# Patient Record
Sex: FEMALE | Race: WHITE
[De-identification: names, ages, dates, MRNs, and addresses within clinical notes are randomized per-mention and may not be internally consistent; named-entity substitution may affect disease eponyms.]

---

## 2020-07-17 ENCOUNTER — APPOINTMENT (OUTPATIENT)
Dept: VASCULAR SURGERY | Facility: CLINIC | Age: 60
End: 2020-07-17
Payer: COMMERCIAL

## 2020-07-17 PROCEDURE — 99204 OFFICE O/P NEW MOD 45 MIN: CPT

## 2020-09-18 ENCOUNTER — APPOINTMENT (OUTPATIENT)
Dept: VASCULAR SURGERY | Facility: CLINIC | Age: 60
End: 2020-09-18
Payer: COMMERCIAL

## 2020-09-18 PROCEDURE — 99214 OFFICE O/P EST MOD 30 MIN: CPT

## 2020-12-16 ENCOUNTER — APPOINTMENT (OUTPATIENT)
Dept: VASCULAR SURGERY | Facility: CLINIC | Age: 60
End: 2020-12-16
Payer: COMMERCIAL

## 2020-12-16 PROCEDURE — 99072 ADDL SUPL MATRL&STAF TM PHE: CPT

## 2020-12-16 PROCEDURE — 99214 OFFICE O/P EST MOD 30 MIN: CPT

## 2021-04-02 ENCOUNTER — APPOINTMENT (OUTPATIENT)
Dept: VASCULAR SURGERY | Facility: CLINIC | Age: 61
End: 2021-04-02
Payer: COMMERCIAL

## 2021-04-02 PROCEDURE — 99214 OFFICE O/P EST MOD 30 MIN: CPT

## 2021-04-02 PROCEDURE — 99072 ADDL SUPL MATRL&STAF TM PHE: CPT

## 2021-11-10 ENCOUNTER — APPOINTMENT (OUTPATIENT)
Dept: VASCULAR SURGERY | Facility: CLINIC | Age: 61
End: 2021-11-10
Payer: COMMERCIAL

## 2021-11-10 PROCEDURE — 99213 OFFICE O/P EST LOW 20 MIN: CPT

## 2022-02-09 ENCOUNTER — APPOINTMENT (OUTPATIENT)
Dept: VASCULAR SURGERY | Facility: CLINIC | Age: 62
End: 2022-02-09
Payer: COMMERCIAL

## 2022-02-09 PROCEDURE — 99214 OFFICE O/P EST MOD 30 MIN: CPT

## 2022-02-10 PROBLEM — Z00.00 ENCOUNTER FOR PREVENTIVE HEALTH EXAMINATION: Status: ACTIVE | Noted: 2022-02-10

## 2022-06-10 ENCOUNTER — APPOINTMENT (OUTPATIENT)
Dept: VASCULAR SURGERY | Facility: CLINIC | Age: 62
End: 2022-06-10
Payer: COMMERCIAL

## 2022-06-10 VITALS — DIASTOLIC BLOOD PRESSURE: 78 MMHG | HEART RATE: 111 BPM | SYSTOLIC BLOOD PRESSURE: 155 MMHG

## 2022-06-10 VITALS — SYSTOLIC BLOOD PRESSURE: 171 MMHG | HEART RATE: 96 BPM | DIASTOLIC BLOOD PRESSURE: 84 MMHG

## 2022-06-10 PROCEDURE — 36475 ENDOVENOUS RF 1ST VEIN: CPT

## 2022-06-10 NOTE — ADDENDUM
[FreeTextEntry1] : The patient was given verbal and written instructions. The patient verbalized understanding of the instructions. Will follow up next week for post procedure u/s.

## 2022-06-10 NOTE — PROCEDURE
[FreeTextEntry1] : Left leg RFA of GSV [FreeTextEntry2] : Symptomatic varicose veins, left leg, symptomatic venous reflux, left leg [FreeTextEntry3] : The patient was seen in the waiting room where the consent was obtained. She was then taken to the procedure room where a timeout was called to verify the patient's identity and the laterality of the procedure. The patient's left leg was then interrogated under ultrasound and an appropriate place for cannulation was identified. The leg was then prepped and draped in the standard fashion. Under ultrasound guidance the patient was given an injection of 1% plain lidocaine about the knee. Access was then obtained with a 7 FR sheath in the standard fashion. Catheter was placed to the SFJ and withdrawn 2.5 cm from the junction. Patient was then given tumescence around the saphenous vein under ultrasound guidance. The ablation was then performed. Steri strips were applied to the catheter insertion site. Leg was wrapped in kerlix, and an ace wrap. Patient was discharged in stable condition.

## 2022-06-16 ENCOUNTER — APPOINTMENT (OUTPATIENT)
Dept: VASCULAR SURGERY | Facility: CLINIC | Age: 62
End: 2022-06-16

## 2022-06-16 ENCOUNTER — APPOINTMENT (OUTPATIENT)
Dept: VASCULAR SURGERY | Facility: CLINIC | Age: 62
End: 2022-06-16
Payer: COMMERCIAL

## 2022-06-16 VITALS — HEART RATE: 90 BPM | DIASTOLIC BLOOD PRESSURE: 79 MMHG | SYSTOLIC BLOOD PRESSURE: 154 MMHG

## 2022-06-16 DIAGNOSIS — I10 ESSENTIAL (PRIMARY) HYPERTENSION: ICD-10-CM

## 2022-06-16 DIAGNOSIS — Z87.891 PERSONAL HISTORY OF NICOTINE DEPENDENCE: ICD-10-CM

## 2022-06-16 DIAGNOSIS — Z78.9 OTHER SPECIFIED HEALTH STATUS: ICD-10-CM

## 2022-06-16 DIAGNOSIS — E78.5 HYPERLIPIDEMIA, UNSPECIFIED: ICD-10-CM

## 2022-06-16 DIAGNOSIS — Z80.8 FAMILY HISTORY OF MALIGNANT NEOPLASM OF OTHER ORGANS OR SYSTEMS: ICD-10-CM

## 2022-06-16 PROCEDURE — 99213 OFFICE O/P EST LOW 20 MIN: CPT

## 2022-06-16 PROCEDURE — 93971 EXTREMITY STUDY: CPT

## 2022-06-20 PROBLEM — Z78.9 CONSUMES ALCOHOL OCCASIONALLY: Status: ACTIVE | Noted: 2022-06-16

## 2022-06-20 PROBLEM — Z80.8 FAMILY HISTORY OF MALIGNANT NEOPLASM OF SKIN: Status: ACTIVE | Noted: 2022-06-16

## 2022-06-20 PROBLEM — Z87.891 FORMER SMOKER: Status: ACTIVE | Noted: 2022-06-16

## 2022-06-20 PROBLEM — I10 HYPERTENSION: Status: ACTIVE | Noted: 2022-06-16

## 2022-06-20 PROBLEM — E78.5 HYPERLIPIDEMIA: Status: ACTIVE | Noted: 2022-06-16

## 2022-06-20 RX ORDER — AMLODIPINE BESYLATE 5 MG/1
TABLET ORAL
Refills: 0 | Status: ACTIVE | COMMUNITY

## 2022-06-20 RX ORDER — ATORVASTATIN CALCIUM 80 MG/1
TABLET, FILM COATED ORAL
Refills: 0 | Status: ACTIVE | COMMUNITY

## 2022-06-20 NOTE — HISTORY OF PRESENT ILLNESS
[FreeTextEntry1] : 61-year-old female who is followed by me for symptomatic lower extremity varicose veins and symptomatic venous reflux.  Her left-sided symptoms were persistent despite the daily use of compression garments and frequent elevation of her legs.  Venous duplex revealed superficial venous reflux.\par She is now 1 week status post left leg RFA of GSV.\par She is doing well without complaints.

## 2022-06-20 NOTE — ASSESSMENT
[FreeTextEntry1] : 62-year-old female with symptomatic left leg varicose veins and symptomatic venous reflux, who is 1 week status post left leg RFA of GSV.  Her postprocedure ultrasound is within normal limits, her symptoms are reduced.  I have recommended to continue use of compression garments on a daily basis.  She will follow-up with me in several weeks, at which point we will determine if phlebectomy is necessary based on her symptoms.

## 2022-06-20 NOTE — PHYSICAL EXAM
[Normal Breath Sounds] : Normal breath sounds [2+] : left 2+ [Alert] : alert [Oriented to Person] : oriented to person [Oriented to Place] : oriented to place [Calm] : calm [de-identified] : NAD [de-identified] : NCAT [de-identified] : supple [de-identified] : soft, nt, nd [de-identified] : Right posterior knee with large clusters of varicose veins, left medial calf with varicose veins soft and compressible

## 2022-07-28 ENCOUNTER — APPOINTMENT (OUTPATIENT)
Dept: VASCULAR SURGERY | Facility: CLINIC | Age: 62
End: 2022-07-28

## 2022-07-28 VITALS — WEIGHT: 120.6 LBS | BODY MASS INDEX: 22.19 KG/M2 | HEIGHT: 62 IN

## 2022-07-28 PROCEDURE — 99213 OFFICE O/P EST LOW 20 MIN: CPT

## 2022-07-28 NOTE — HISTORY OF PRESENT ILLNESS
[FreeTextEntry1] : 61-year-old female who is followed by me for symptomatic lower extremity varicose veins and symptomatic venous reflux.  Her left-sided symptoms were persistent despite the daily use of compression garments and frequent elevation of her legs.  Venous duplex revealed superficial venous reflux.\par She is now 1 week status post left leg RFA of GSV.\par She is doing well without complaints. [de-identified] : 7/28/22 - She is approximately 6-week status post left leg RFA of GSV.  Her varicose veins are resolved, and she no longer has heaviness and aching of her leg.  She is doing quite well.

## 2022-07-28 NOTE — ASSESSMENT
[FreeTextEntry1] : 62-year-old female with symptomatic left leg varicose veins and symptomatic venous reflux, who is 8 weeks status post left leg RFA of GSV.  Her postprocedure ultrasound is within normal limits, her symptoms are reduced.  \par Her varicose veins are no longer apparent, and her symptoms are fully resolved.\par She has done quite well.  I have suggested continued use of compression as tolerated.\par She will follow-up with me on an as-needed basis.

## 2022-07-28 NOTE — PHYSICAL EXAM
[Normal Breath Sounds] : Normal breath sounds [2+] : left 2+ [Alert] : alert [Oriented to Person] : oriented to person [Oriented to Place] : oriented to place [Calm] : calm [de-identified] : NAD [de-identified] : NCAT [de-identified] : supple [de-identified] : soft, nt, nd [de-identified] : Right posterior knee with large clusters of varicose veins, left medial calf with varicose veins soft and compressible

## 2023-03-10 ENCOUNTER — APPOINTMENT (OUTPATIENT)
Dept: VASCULAR SURGERY | Facility: CLINIC | Age: 63
End: 2023-03-10
Payer: COMMERCIAL

## 2023-03-10 VITALS — BODY MASS INDEX: 22.08 KG/M2 | HEIGHT: 62 IN | WEIGHT: 120 LBS

## 2023-03-10 DIAGNOSIS — I82.401 ACUTE EMBOLISM AND THROMBOSIS OF UNSPECIFIED DEEP VEINS OF RIGHT LOWER EXTREMITY: ICD-10-CM

## 2023-03-10 PROCEDURE — 99215 OFFICE O/P EST HI 40 MIN: CPT

## 2023-03-10 RX ORDER — APIXABAN 5 MG/1
5 TABLET, FILM COATED ORAL
Qty: 180 | Refills: 2 | Status: ACTIVE | COMMUNITY
Start: 2023-03-10 | End: 1900-01-01

## 2023-03-10 NOTE — HISTORY OF PRESENT ILLNESS
[FreeTextEntry1] : 61-year-old female who is followed by me for symptomatic lower extremity varicose veins and symptomatic venous reflux.  Her left-sided symptoms were persistent despite the daily use of compression garments and frequent elevation of her legs.  Venous duplex revealed superficial venous reflux.\par She is now 1 week status post left leg RFA of GSV.\par She is doing well without complaints. [de-identified] : 7/28/22 - She is approximately 6-week status post left leg RFA of GSV.  Her varicose veins are resolved, and she no longer has heaviness and aching of her leg.  She is doing quite well.\par \par 3/10/23 - She returns for an urgent visit.  She reports that approximately 2 weeks ago, she had an episode of bleeding from a superficial vein of her right leg.  She reports that this happened after she was shaving her legs, unsure if it was from a superficial vein.  Regardless, she applied pressure dressing visit at the [place] ER.  In the ER the bleeding had stopped.  Over the last week, she developed pain, tenderness and redness overlying the grapelike cluster of varicose veins behind her right knee.  She denies new onset swelling of her leg including the calf or thigh.  She reports that due to the tenderness overlying the veins on the back of her right knee, she has had some trouble fully bending her knee.

## 2023-03-10 NOTE — PHYSICAL EXAM
[Normal Breath Sounds] : Normal breath sounds [2+] : left 2+ [Alert] : alert [Oriented to Person] : oriented to person [Oriented to Place] : oriented to place [Calm] : calm [de-identified] : NAD [de-identified] : NCAT [de-identified] : supple [de-identified] : soft, nt, nd [de-identified] : Right posterior knee with large clusters of varicose veins, varicose veins involving the mid to upper posterior right calf -noncompressible, overlying erythema and tenderness to palpation

## 2023-03-10 NOTE — ASSESSMENT
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  Due to the extensive distribution of varicosities involved, I discussed with her anticoagulation for 45 days to 2-month span, and I prescribed this to her.  In addition, I have advised for symptomatic and supportive care with the use of warm compresses multiple times a day to the affected area, as well as utilizing compression garments improves and she can tolerate this.  She will take anti-inflammatories for the next 10 to 14 days for pain control and to reduce inflammation overlying the thrombosed varicosities.  In addition, we will obtain a venous duplex to rule out deep venous involvement, although she will be anticoagulated at this time anyway.\par She will return for follow-up so that we can ensure clinical improvement.  I discussed with her that the distribution of chronic blood varicosities on the back of her right knee may remain thrombosed without recanalization.  She understands.\par She does have superficial GSV reflux of the right leg, and after clinical resolution of a course of SVT, we may consider ablation to minimize chances of recurrence and alleviate her symptoms.

## 2023-03-10 NOTE — DATA REVIEWED
[FreeTextEntry1] : SP DUPLEX VENOUS LOWER EXTREMITY RIGHT \par  \par Clinical statement: [id number]: Varicose veins of r low extrem with other \par complications,    C/C: \par   \par RIGHT: The common femoral, femoral, and popliteal veins have normal spontaneity, \par phasicity, augmentation, and compressibility. The saphenous veins also have \par normal compressibility. There is no evidence of a deep or superficial venous \par thrombosis. \par   \par VEIN MAPPING: \par   \par RIGHT: \par                                                      Size \par Greater saphenous vein               \par   High thigh                                   5.4 mm \par   Mid thigh                                     5.4 mm \par   Lower thigh                                 3 mm \par   Knee                                           3.9 mm \par   Upper calf                                   2.3 mm \par   Mid Calf                                      2.0 mm \par   Lower calf                                  1.5 mm \par    \par Small saphenous vein                  \par   High calf                                    2 mm \par   Mid calf \par   Lower calf \par   \par IMPRESSION: No evidence for DVT right lower extremity. \par   \par There is reflux in the common femoral vein and femoral vein involving its \par proximal mid and distal portions. The common femoral vein this measures up to \par 3.4 seconds and in the femoral vein maximal interval is 3.1 seconds. \par   \par There is reflux in the greater saphenous vein from the upper thigh to the level \par of the knee and into the mid calf. The longest measured interval is 5.0 seconds. \par   \par Electronically Signed in [employer] By Dr. Marcellus Cruz MD on 9/18/2020 11:04 \par AM

## 2023-03-16 ENCOUNTER — APPOINTMENT (OUTPATIENT)
Dept: VASCULAR SURGERY | Facility: CLINIC | Age: 63
End: 2023-03-16
Payer: COMMERCIAL

## 2023-03-16 PROCEDURE — 99214 OFFICE O/P EST MOD 30 MIN: CPT

## 2023-03-16 PROCEDURE — 93971 EXTREMITY STUDY: CPT

## 2023-03-16 NOTE — HISTORY OF PRESENT ILLNESS
[FreeTextEntry1] : 61-year-old female who is followed by me for symptomatic lower extremity varicose veins and symptomatic venous reflux.  Her left-sided symptoms were persistent despite the daily use of compression garments and frequent elevation of her legs.  Venous duplex revealed superficial venous reflux.\par She is now 1 week status post left leg RFA of GSV.\par She is doing well without complaints. [de-identified] : 7/28/22 - She is approximately 6-week status post left leg RFA of GSV.  Her varicose veins are resolved, and she no longer has heaviness and aching of her leg.  She is doing quite well.\par \par 3/10/23 - She returns for an urgent visit.  She reports that approximately 2 weeks ago, she had an episode of bleeding from a superficial vein of her right leg.  She reports that this happened after she was shaving her legs, unsure if it was from a superficial vein.  Regardless, she applied pressure dressing visit at the [place] ER.  In the ER the bleeding had stopped.  Over the last week, she developed pain, tenderness and redness overlying the grapelike cluster of varicose veins behind her right knee.  She denies new onset swelling of her leg including the calf or thigh.  She reports that due to the tenderness overlying the veins on the back of her right knee, she has had some trouble fully bending her knee.\par \par 3/16/23 - She is doing very well  and reports her varicose veins are starting to decompress. She reports less pain than her last visit. She underwent a venous US and is here to discuss the results. She continues to wear compression stockings with improvement of her symptoms and continues to take Eliquis as prescribed.

## 2023-03-16 NOTE — PHYSICAL EXAM
[Normal Breath Sounds] : Normal breath sounds [2+] : left 2+ [Alert] : alert [Oriented to Person] : oriented to person [Oriented to Place] : oriented to place [Calm] : calm [de-identified] : NAD [de-identified] : NCAT [de-identified] : supple [de-identified] : soft, nt, nd [de-identified] : Right posterior knee with large clusters of varicose veins, varicose veins involving the mid to upper posterior right calf -noncompressible, overlying erythema and tenderness has significantly reduced

## 2023-03-16 NOTE — ASSESSMENT
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  Due to the extensive distribution of varicosities involved, I discussed with her anticoagulation for 45 days to 2-month span, and I prescribed this to her.  In addition, I have advised for symptomatic and supportive care with the use of warm compresses multiple times a day to the affected area, as well as utilizing compression garments improves and she can tolerate this.  She will take anti-inflammatories for the next 10 to 14 days for pain control and to reduce inflammation overlying the thrombosed varicosities.  In addition, we will obtain a venous duplex to rule out deep venous involvement, although she will be anticoagulated at this time anyway.\par She will return for follow-up so that we can ensure clinical improvement.  I discussed with her that the distribution of chronic blood varicosities on the back of her right knee may remain thrombosed without recanalization.  She understands.\par \par ON FOLLOW-UP, she has been compliant with anticoagulation which she has tolerated well, and her symptoms of extensive SVT are much reduced.  Significant portion of the varicosities have recanalized, and the erythema and tenderness overlying the affected segments is reduced.  We did perform a venous duplex, with results above, no DVT, no SVT involving the GSV or SSV, there is significant GSV reflux into the varicosities of concern.\par She will continue symptomatic care for another week, and she will continue anticoagulation for total of 6 to 8 weeks.  I discussed with her ablation of her refluxing GSV to avoid further symptoms or recurrent SVT.  She wishes to proceed.\par We will schedule right leg RFA of GSV.  She will continue compression therapy on a daily basis.

## 2023-03-16 NOTE — PROCEDURE
[FreeTextEntry1] : Right leg venous ultrasound\par No DVT\par There is extensive SVT involving the varicose veins behind the knee as well as varicosities above and below, no SVT involving the GSV or SSV

## 2023-03-16 NOTE — DATA REVIEWED
[FreeTextEntry1] : SP DUPLEX VENOUS LOWER EXTREMITY RIGHT \par  \par Clinical statement: [id number]: Varicose veins of r low extrem with other \par complications,    C/C: \par   \par RIGHT: The common femoral, femoral, and popliteal veins have normal spontaneity, \par phasicity, augmentation, and compressibility. The saphenous veins also have \par normal compressibility. There is no evidence of a deep or superficial venous \par thrombosis. \par \par svt\par right gsv reflux\par   \par VEIN MAPPING: \par   \par RIGHT: \par                                                      Size \par Greater saphenous vein               \par   High thigh                                   5.4 mm \par   Mid thigh                                     5.4 mm \par   Lower thigh                                 3 mm \par   Knee                                           3.9 mm \par   Upper calf                                   2.3 mm \par   Mid Calf                                      2.0 mm \par   Lower calf                                  1.5 mm \par    \par Small saphenous vein                  \par   High calf                                    2 mm \par   Mid calf \par   Lower calf \par   \par IMPRESSION: No evidence for DVT right lower extremity. \par   \par There is reflux in the common femoral vein and femoral vein involving its \par proximal mid and distal portions. The common femoral vein this measures up to \par 3.4 seconds and in the femoral vein maximal interval is 3.1 seconds. \par   \par There is reflux in the greater saphenous vein from the upper thigh to the level \par of the knee and into the mid calf. The longest measured interval is 5.0 seconds. \par   \par Electronically Signed in [employer] By Dr. Marcellus Cruz MD on 9/18/2020 11:04 \par AM

## 2023-06-08 ENCOUNTER — APPOINTMENT (OUTPATIENT)
Dept: VASCULAR SURGERY | Facility: CLINIC | Age: 63
End: 2023-06-08
Payer: COMMERCIAL

## 2023-06-08 VITALS — DIASTOLIC BLOOD PRESSURE: 75 MMHG | HEART RATE: 92 BPM | SYSTOLIC BLOOD PRESSURE: 121 MMHG

## 2023-06-08 VITALS — HEART RATE: 91 BPM | SYSTOLIC BLOOD PRESSURE: 153 MMHG | DIASTOLIC BLOOD PRESSURE: 88 MMHG

## 2023-06-08 PROCEDURE — 36475 ENDOVENOUS RF 1ST VEIN: CPT

## 2023-06-08 NOTE — PROCEDURE
[FreeTextEntry1] : Right leg RFA of GSV [FreeTextEntry2] : Right leg symptomatic varicose veins, symptomatic venous reflux [FreeTextEntry3] : The patient was seen in the waiting room where the consent was obtained. She was then taken to the procedure room where a timeout was called to verify the patient's identity and the laterality of the procedure. The patient's right leg was then interrogated under ultrasound and an appropriate place for cannulation was identified. The leg was then prepped and draped in the standard fashion. Under ultrasound guidance the patient was given an injection of 1% plain lidocaine about the knee. Access was then obtained with a 7 FR sheath in the standard fashion. Catheter was placed to the SFJ and withdrawn 2.5 cm from the junction. Patient was then given tumescence around the saphenous vein under ultrasound guidance. The ablation was then performed. Steri strips were applied to the catheter insertion site. Leg was wrapped in kerlix, and an ace wrap. Patient was discharged in stable condition.

## 2023-06-15 ENCOUNTER — APPOINTMENT (OUTPATIENT)
Dept: VASCULAR SURGERY | Facility: CLINIC | Age: 63
End: 2023-06-15
Payer: COMMERCIAL

## 2023-06-15 DIAGNOSIS — I80.01 PHLEBITIS AND THROMBOPHLEBITIS OF SUPERFICIAL VESSELS OF RIGHT LOWER EXTREMITY: ICD-10-CM

## 2023-06-15 DIAGNOSIS — I83.892 VARICOSE VEINS OF LEFT LOWER EXTREMITY WITH OTHER COMPLICATIONS: ICD-10-CM

## 2023-06-15 PROCEDURE — 99213 OFFICE O/P EST LOW 20 MIN: CPT

## 2023-06-15 PROCEDURE — 93971 EXTREMITY STUDY: CPT

## 2023-06-26 PROBLEM — I83.892 SYMPTOMATIC VARICOSE VEINS, LEFT: Status: ACTIVE | Noted: 2022-06-10

## 2023-06-29 PROBLEM — I80.01 THROMBOPHLEBITIS OF SUPERFICIAL VEINS OF RIGHT LOWER EXTREMITY: Status: ACTIVE | Noted: 2023-03-10

## 2023-06-29 NOTE — DATA REVIEWED
[FreeTextEntry1] : Right lower extremity venous duplex performed at our [facility] -\par \par No DVT\par \par Appropriately ablated GSV

## 2023-06-29 NOTE — HISTORY OF PRESENT ILLNESS
[FreeTextEntry1] : 61-year-old female who is followed by me for symptomatic lower extremity varicose veins and symptomatic venous reflux.  Her left-sided symptoms were persistent despite the daily use of compression garments and frequent elevation of her legs.  Venous duplex revealed superficial venous reflux.\par She is now 1 week status post left leg RFA of GSV.\par She is doing well without complaints. [de-identified] : 7/28/22 - She is approximately 6-week status post left leg RFA of GSV.  Her varicose veins are resolved, and she no longer has heaviness and aching of her leg.  She is doing quite well.\par \par 3/10/23 - She returns for an urgent visit.  She reports that approximately 2 weeks ago, she had an episode of bleeding from a superficial vein of her right leg.  She reports that this happened after she was shaving her legs, unsure if it was from a superficial vein.  Regardless, she applied pressure dressing visit at the [place] ER.  In the ER the bleeding had stopped.  Over the last week, she developed pain, tenderness and redness overlying the grapelike cluster of varicose veins behind her right knee.  She denies new onset swelling of her leg including the calf or thigh.  She reports that due to the tenderness overlying the veins on the back of her right knee, she has had some trouble fully bending her knee.\par \par 3/16/23 - She is doing very well  and reports her varicose veins are starting to decompress. She reports less pain than her last visit. She underwent a venous US and is here to discuss the results. She continues to wear compression stockings with improvement of her symptoms and continues to take Eliquis as prescribed.\par \par 6/15/23 - Patient is now approximately one week s/p an ablation of the right GSV. She is doing very well post-procedure and has been compliant with daily compression. She reports that her symptoms have significantly improved and her varicose veins have decompressed. \par

## 2023-06-29 NOTE — ASSESSMENT
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  Due to the extensive distribution of varicosities involved, I discussed with her anticoagulation for 45 days to 2-month span, and I prescribed this to her.  In addition, I have advised for symptomatic and supportive care with the use of warm compresses multiple times a day to the affected area, as well as utilizing compression garments improves and she can tolerate this.  She will take anti-inflammatories for the next 10 to 14 days for pain control and to reduce inflammation overlying the thrombosed varicosities.  In addition, we will obtain a venous duplex to rule out deep venous involvement, although she will be anticoagulated at this time anyway.\par She will return for follow-up so that we can ensure clinical improvement.  I discussed with her that the distribution of chronic blood varicosities on the back of her right knee may remain thrombosed without recanalization.  She understands.\par \par ON FOLLOW-UP, she is s/p an ablation fo the right GSV approximately one week ago.\par Significant portion of the varicosities have decompressed and her symptoms have improved. She underwent a right lower extremity venous duplex that demonstrated an appropriately ablated GSV and no evidence of DVT. Continue daily compression and anticoagulation.\par \par She will follow up on a PRN basis.

## 2023-06-29 NOTE — PHYSICAL EXAM
[Normal Breath Sounds] : Normal breath sounds [2+] : left 2+ [Alert] : alert [Oriented to Person] : oriented to person [Oriented to Place] : oriented to place [Calm] : calm [Oriented to Time] : oriented to time [de-identified] : NAD [de-identified] : NCAT [de-identified] : supple [de-identified] : soft, nt, nd [de-identified] : Right posterior knee with large clusters of varicose veins resolved, varicose veins involving the mid to upper posterior right calf resolved